# Patient Record
Sex: MALE | Race: WHITE
[De-identification: names, ages, dates, MRNs, and addresses within clinical notes are randomized per-mention and may not be internally consistent; named-entity substitution may affect disease eponyms.]

---

## 2020-11-02 ENCOUNTER — HOSPITAL ENCOUNTER (EMERGENCY)
Dept: HOSPITAL 92 - ERS | Age: 50
Discharge: HOME | End: 2020-11-02
Payer: COMMERCIAL

## 2020-11-02 DIAGNOSIS — R07.89: Primary | ICD-10-CM

## 2020-11-02 LAB
ALBUMIN SERPL BCG-MCNC: 4.6 G/DL (ref 3.5–5)
ALP SERPL-CCNC: 91 U/L (ref 40–110)
ALT SERPL W P-5'-P-CCNC: 35 U/L (ref 8–55)
ANION GAP SERPL CALC-SCNC: 16 MMOL/L (ref 10–20)
AST SERPL-CCNC: 19 U/L (ref 5–34)
BASOPHILS # BLD AUTO: 0.1 THOU/UL (ref 0–0.2)
BASOPHILS NFR BLD AUTO: 1 % (ref 0–1)
BILIRUB SERPL-MCNC: 0.2 MG/DL (ref 0.2–1.2)
BUN SERPL-MCNC: 16 MG/DL (ref 8.9–20.6)
CALCIUM SERPL-MCNC: 9.6 MG/DL (ref 7.8–10.44)
CHLORIDE SERPL-SCNC: 105 MMOL/L (ref 98–107)
CK SERPL-CCNC: 135 U/L (ref 30–200)
CO2 SERPL-SCNC: 25 MMOL/L (ref 22–29)
CREAT CL PREDICTED SERPL C-G-VRATE: 0 ML/MIN (ref 70–130)
EOSINOPHIL # BLD AUTO: 0.3 THOU/UL (ref 0–0.7)
EOSINOPHIL NFR BLD AUTO: 2.7 % (ref 0–10)
GLOBULIN SER CALC-MCNC: 3.8 G/DL (ref 2.4–3.5)
GLUCOSE SERPL-MCNC: 97 MG/DL (ref 70–105)
HGB BLD-MCNC: 15 G/DL (ref 14–18)
LYMPHOCYTES # BLD: 2.3 THOU/UL (ref 1.2–3.4)
LYMPHOCYTES NFR BLD AUTO: 21.1 % (ref 21–51)
MCH RBC QN AUTO: 30 PG (ref 27–31)
MCV RBC AUTO: 86 FL (ref 78–98)
MONOCYTES # BLD AUTO: 0.6 THOU/UL (ref 0.11–0.59)
MONOCYTES NFR BLD AUTO: 5.8 % (ref 0–10)
NEUTROPHILS # BLD AUTO: 7.5 THOU/UL (ref 1.4–6.5)
NEUTROPHILS NFR BLD AUTO: 69.5 % (ref 42–75)
PLATELET # BLD AUTO: 182 THOU/UL (ref 130–400)
POTASSIUM SERPL-SCNC: 3.8 MMOL/L (ref 3.5–5.1)
RBC # BLD AUTO: 4.99 MILL/UL (ref 4.7–6.1)
SODIUM SERPL-SCNC: 142 MMOL/L (ref 136–145)
TROPONIN I SERPL DL<=0.01 NG/ML-MCNC: (no result) NG/ML (ref ?–0.03)
WBC # BLD AUTO: 10.8 THOU/UL (ref 4.8–10.8)

## 2020-11-02 PROCEDURE — 84484 ASSAY OF TROPONIN QUANT: CPT

## 2020-11-02 PROCEDURE — 85025 COMPLETE CBC W/AUTO DIFF WBC: CPT

## 2020-11-02 PROCEDURE — 71045 X-RAY EXAM CHEST 1 VIEW: CPT

## 2020-11-02 PROCEDURE — 93005 ELECTROCARDIOGRAM TRACING: CPT

## 2020-11-02 PROCEDURE — 80053 COMPREHEN METABOLIC PANEL: CPT

## 2020-11-02 PROCEDURE — 36415 COLL VENOUS BLD VENIPUNCTURE: CPT

## 2020-11-02 PROCEDURE — 82550 ASSAY OF CK (CPK): CPT

## 2020-11-02 NOTE — RAD
RADIOGRAPH CHEST 1 VIEW:



DATE:

11/2/2020



HISTORY:

50-year-old male with chest pain



FINDINGS:

The thoracic aorta is tortuous and ectatic. There is no evidence of airspace density, pulmonary edema
, cardiomegaly, or pneumothorax. The lateral costophrenic angles are not effaced.



IMPRESSION:

1) No acute pulmonary findings.

2) ectasia of thoracic aorta.



Reported By: Logan Upton 

Electronically Signed:  11/2/2020 2:46 PM